# Patient Record
Sex: FEMALE | Race: WHITE | NOT HISPANIC OR LATINO | Employment: OTHER | URBAN - METROPOLITAN AREA
[De-identification: names, ages, dates, MRNs, and addresses within clinical notes are randomized per-mention and may not be internally consistent; named-entity substitution may affect disease eponyms.]

---

## 2024-01-09 ENCOUNTER — OFFICE VISIT (OUTPATIENT)
Dept: DERMATOLOGY | Facility: CLINIC | Age: 44
End: 2024-01-09
Payer: MEDICAID

## 2024-01-09 DIAGNOSIS — L30.9 DERMATITIS: Primary | ICD-10-CM

## 2024-01-09 DIAGNOSIS — L21.9 SEBORRHEIC DERMATITIS: ICD-10-CM

## 2024-01-09 PROCEDURE — 99204 OFFICE O/P NEW MOD 45 MIN: CPT | Performed by: NURSE PRACTITIONER

## 2024-01-09 RX ORDER — KETOCONAZOLE 20 MG/ML
SHAMPOO, SUSPENSION TOPICAL
COMMUNITY
Start: 2024-01-02

## 2024-01-09 RX ORDER — CLOBETASOL PROPIONATE 0.5 MG/G
OINTMENT TOPICAL
Qty: 60 G | Refills: 1 | Status: SHIPPED | OUTPATIENT
Start: 2024-01-09

## 2024-01-09 RX ORDER — DILTIAZEM HYDROCHLORIDE 120 MG/1
1 CAPSULE, COATED, EXTENDED RELEASE ORAL EVERY 24 HOURS
COMMUNITY
Start: 2013-11-13

## 2024-01-09 RX ORDER — TRIAMCINOLONE ACETONIDE 1 MG/G
CREAM TOPICAL
COMMUNITY
Start: 2024-01-02

## 2024-01-09 RX ORDER — CLOBETASOL PROPIONATE 0.46 MG/ML
SOLUTION TOPICAL
Qty: 50 ML | Refills: 2 | Status: SHIPPED | OUTPATIENT
Start: 2024-01-09

## 2024-01-09 RX ORDER — SELENIUM SULFIDE 2.5 MG/100ML
LOTION TOPICAL DAILY PRN
Qty: 118 ML | Refills: 11 | Status: SHIPPED | OUTPATIENT
Start: 2024-01-09

## 2024-01-09 RX ORDER — MEDROXYPROGESTERONE ACETATE 150 MG/ML
INJECTION, SUSPENSION INTRAMUSCULAR
COMMUNITY

## 2024-01-09 RX ORDER — PREDNISONE 10 MG/1
TABLET ORAL
COMMUNITY

## 2024-01-09 RX ORDER — CLOTRIMAZOLE 1 %
CREAM (GRAM) TOPICAL
COMMUNITY
Start: 2024-01-02

## 2024-01-09 NOTE — PROGRESS NOTES
Subjective     Asia Rojo is a 43 y.o. female who presents for the following: Nail Problem (Fingernails. Having pain, burning and itching.).     New problem involving the fingers/hands started about 2.5 weeks. Has tried prescription cream but can not remember name following urgent care appointment. Has tried lotrimin cream. Works as a . 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 Itch can be 8/10 at night but most of the 2-5/10. Has stabbing shooting burning itchy sensation that comes and goes.     Scalp itch - has been ongoing for years. Has tried OTC antifungal shampoos and ketoconazole shampoo, did not work. Sulfa based shampoos worked the best.       Review of Systems:  No other skin or systemic complaints other than what is documented elsewhere in the note.    The following portions of the chart were reviewed this encounter and updated as appropriate:   Allergies  Meds  Problems  Med Hx  Surg Hx         Skin Cancer History  No skin cancer on file.      Specialty Problems    None       Objective   Well appearing patient in no apparent distress; mood and affect are within normal limits.    A focused skin examination was performed waist up. All findings within normal limits unless otherwise noted below.    Assessment/Plan   1. Dermatitis  Left Hand - Anterior, Right Hand - Anterior  Mostly distal fingers favoring periungal area are mild pink to hyperpigmented semi scaly patches. No fissures noted. Unable to examine nails today given fingernail polish. No obvious nail thickening or subungal debris noted.     DDx: Mild hand dermatitis vs ICD vs xerosis 2/2 to wear vinyl gloves for long periods of time from job. Unable to evaluate nail. No deep skin folds noted to periungal area, and no signs of infection (erythema, induration, swelling). Recommended regarding shooting burning itching sensation that involves arm to consider follow up  with PCP to evaluate for potential cervical radiculopathy. Skin findings on exam today are very mild and severity of itch is slightly disproportionate to skin findings. May have some associated neuropathy.     PLAN:  1  See Patient care instructions and follow as discussed  2.  Vaseline ointment or other moisturizing cream at least twice daily   3.  Apply Clobetasol ointment twice daily to affected areas on the arms, body, and legs for 2 weeks then daily x1 week then every other day x1 week then as needed. When using as needed, use less than 14 days per month. May cover with cotton gloves after application to aid in absorption/effectiveness.  4. Encouraged patient to wear 100% cotton gloves and cover these gloves with vinyl gloves while working  5. Discussed follow up, patient wants to call for follow up as she may be going out of town in 6 weeks.   6. Continue with use of aquaphor or other moisturizing ointment 1-2 times daily.    The following information regarding the use of topical steroids was provided: Local skin thinning,striae, and telangiectasia can occur with chronic application of this medication.  Long term use oftopical steroids should be avoided      Related Medications  clobetasol (Temovate) 0.05 % ointment  Apply to affected areas twice daily for 2 weeks then daily x1 week then every other day x1 week then as needed. When using as needed, use less than 14 days per month.    2. Seborrheic dermatitis  Mid Parietal Scalp  Moderate greasy and dry scaly hyperpigmented to erythematous papules or plaques with greasy scale to affected areas    PLAN    Selenium sulfide 2.5%  shampoo daily to every other day rinse after 5-10 minutes (starting here first given report of sulfa based shampoos working best in the past)  2.   Clobetasol 0.05% scalp solution twice daily as needed for itch. Avoid daily use.     Related Medications  selenium sulfide (Selsun) 2.5 % shampoo  Apply topically once daily as needed for  dandruff. Every other day apply to scalp. Rinse after 5-10 minutes.    clobetasol (Temovate) 0.05 % external solution  Apply to affected areas twice daily for 2 weeks then daily x1 week then every other day x1 week then as needed. When using as needed, use less than 14 days per month.        Return in 6 months for routine skin check or return to clinic sooner if needed